# Patient Record
Sex: FEMALE | Race: BLACK OR AFRICAN AMERICAN
[De-identification: names, ages, dates, MRNs, and addresses within clinical notes are randomized per-mention and may not be internally consistent; named-entity substitution may affect disease eponyms.]

---

## 2018-09-17 ENCOUNTER — HOSPITAL ENCOUNTER (OUTPATIENT)
Dept: HOSPITAL 61 - KCIC MAMMO | Age: 68
Discharge: HOME | End: 2018-09-17
Attending: INTERNAL MEDICINE
Payer: MEDICARE

## 2018-09-17 DIAGNOSIS — Z80.3: ICD-10-CM

## 2018-09-17 DIAGNOSIS — N63.12: ICD-10-CM

## 2018-09-17 DIAGNOSIS — Z12.31: Primary | ICD-10-CM

## 2018-09-17 PROCEDURE — 77067 SCR MAMMO BI INCL CAD: CPT

## 2018-09-17 PROCEDURE — 77063 BREAST TOMOSYNTHESIS BI: CPT

## 2018-09-18 NOTE — [facility]
Bilateral digital screening mammograms with 3-D tomosynthesis:

 

Reason for examination: Routine screening.

 

Comparison is made to previous studies dated 4/6/2016 and 3/17/2015.

 

Bilateral mammograms in CC and oblique projections were obtained with 2-D 

imaging and 3-D tomosynthesis imaging on a Siemens Inspiration unit and 

reviewed on the workstation. Interpretation was made with the benefit of 

CAD.

 

The skin and nipples show no abnormalities. No abnormal axillary lymph 

nodes are seen. The breast parenchyma shows scattered fatty and 

fibroglandular density. (Breast density: Category B.) There continues to 

be a bilobed nodule at the 2:00 B position of the right breast which is 

unchanged. There are no new dominant masses, suspicious calcifications or 

architectural distortion. Benign calcifications are present.

 

Impression:

 

No evidence of malignancy. Recommend routine screening.

 

BI-RAD Category 2: Benign.

 

"Our facility is accredited by the [employer] 

Mammography Program."

 

This patient's information has been entered into a reminder system for the

patient to be notified with the results of her examination and a target 

date for the next mammogram.

 

Electronically signed by: Jodi Gee MD (9/18/2018 8:23 AM) [id number]

## 2020-06-22 ENCOUNTER — HOSPITAL ENCOUNTER (OUTPATIENT)
Dept: HOSPITAL 61 - KCIC MAMMO | Age: 70
Discharge: HOME | End: 2020-06-22
Attending: INTERNAL MEDICINE
Payer: COMMERCIAL

## 2020-06-22 DIAGNOSIS — N63.24: ICD-10-CM

## 2020-06-22 DIAGNOSIS — Z12.31: Primary | ICD-10-CM

## 2020-06-22 PROCEDURE — 77067 SCR MAMMO BI INCL CAD: CPT

## 2020-06-22 NOTE — [facility]
Bilateral digital screening mammograms:

 

Reason for examination: Routine screening.

 

Comparison is made to previous studies dated 9/17/2018 and 4/6/2016.

 

Interpretation was made with the benefit of CAD.

 

The skin and nipples show no abnormalities. No abnormal lymph nodes are 

seen. The breast parenchyma is predominantly fatty. (Breast density: 

Category A.) There continues to be a small nodular asymmetry medially in 

the 3:00 position of the right breast which is stable. There is however a 

new nodule present posterior medially in the left breast at approximately 

the 8:30 position measuring 1.5 x 1.2 cm in size. Further evaluation with 

ultrasound is recommended. There are no other dominant masses, suspicious 

calcifications or architectural distortions. A few benign calcifications 

are again seen.

 

Impression:

 

New 1.5 x 1.2 cm nodule posterior medially in the left breast at the 8:30 

C position. Recommend further evaluation with ultrasound.

 

BI-RADS Category 0: Incomplete. Needs additional imaging evaluation.

 

"Our facility is accredited by the [employer] 

Mammography Program."

 

This patient's information has been entered into a reminder system for the

patient to be notified with the results of her examination and a target 

date for the next mammogram.

 

Electronically signed by: Jodi Gee MD (6/22/2020 2:11 PM) [email or web address]

## 2020-08-12 ENCOUNTER — HOSPITAL ENCOUNTER (OUTPATIENT)
Dept: HOSPITAL 61 - KCIC US | Age: 70
Discharge: HOME | End: 2020-08-12
Attending: INTERNAL MEDICINE
Payer: COMMERCIAL

## 2020-08-12 DIAGNOSIS — R92.8: Primary | ICD-10-CM

## 2020-08-12 DIAGNOSIS — N63.24: ICD-10-CM

## 2020-08-12 PROCEDURE — 76641 ULTRASOUND BREAST COMPLETE: CPT

## 2020-08-12 NOTE — [facility]
Left breast ultrasound:

 

Reason for examination: Nodule on screening mammogram.

 

Comparison is made to mammographic exam dated 6/22/2020.

 

Ultrasound examination of the left breast and axilla was performed with 

attention to the area of mammographic concern. 

 

In the 8:30 position 9 cm from the nipple, there is a 1 cm hypoechoic 

nodule with some mild posterior acoustic shadowing. Malignancy cannot be 

excluded. No other cystic or solid nodules are seen. No abnormal appearing

lymph nodes are seen in the axilla.

 

IMPRESSION:

 

1.1 cm nodule at the 8:30 position 9 cm from the nipple corresponds to the

area of mammographic concern and is suspicious of malignancy. Further 

evaluation with ultrasound-guided biopsy is recommended.

 

BI-RADS Category 4:  Suspicious.

 

These findings have been discussed with the patient and the patient's 

physician, Dr. Del Rosario will be notified about these findings when the

office reopens on Thursday.

 

"Our facility is accredited by the [employer] 

Mammography Program."

 

This patient's information has been entered into a reminder system for the

patient to be notified with the results of her examination and a target 

date for the next mammogram.

 

Electronically signed by: Jodi Gee MD (8/12/2020 1:21 PM) [email or web address]